# Patient Record
(demographics unavailable — no encounter records)

---

## 2025-01-09 NOTE — PHYSICAL EXAM
[Normal Breath Sounds] : Normal breath sounds [No Rash or Lesion] : No rash or lesion [Alert] : alert [Calm] : calm [JVD] : no jugular venous distention  [de-identified] : Overweight [de-identified] : Normal [de-identified] : Protuberant abdomen, large rectus diastasis [de-identified] : Incarcerated ventral hernia

## 2025-01-09 NOTE — ASSESSMENT
[FreeTextEntry1] : Kristen is a pleasant 50-year-old  presenting to the office with her family member Brinda (who is a recent patient of mine) with no significant past medical history other than a laparoscopic cholecystectomy in 2007 in [place] now presenting with pain and swelling in the mid abdomen suspicious for hernia.  Physical examination demonstrates a plum sized tender bulge 4-5 fingerbreadths above the umbilicus which is only partially reducible consistent with an incarcerated ventral hernia warranting surgical repair.  There is no evidence of strangulation, and the patient denies any symptoms of obstruction.  This hernia is complicated by a moderate to large rectus diastasis likely related to her excess abdominal weight and protuberant abdomen.  Her current BMI is 53.  I explained the pros and cons of surgery, as well as all risks, benefits, indications and alternatives of the procedure and the patient understood and agreed.  Kristen was scheduled for the repair of her incarcerated ventral hernia with mesh on Thursday, February 6, 2025 under LOCAL with IV SEDATION at [facility] with presurgical testing preceding this date. She was encouraged to avoid heavy lifting and strenuous activity in the interim, of course.  We also discussed the importance of calorie restriction, healthy eating, and moderate aerobic exercise with regard to weight loss, hernia recurrence and her overall health.

## 2025-02-19 NOTE — ASSESSMENT
[FreeTextEntry1] : SILVIO INFANTE underwent an incarcerated ventral hernia repair with mesh with Dr. Cadena on 2/6/25  under local IV sedation without any problems or complications. Her wound is clean, dry and intact. There is no evidence of erythema, seroma formation or infection. She is tolerating a diet and having normal bowel movements. She denies any significant postoperative pain or discomfort at this time.   She was counseled and reassured. SILVIO was discharged from the office with no specific follow up necessary, but she knows to avoid any heavy lifting or strenuous activity for the next several weeks. She  was encouraged to continue to wear her abdominal binder for the better part of the next month.

## 2025-02-19 NOTE — PHYSICAL EXAM
[JVD] : no jugular venous distention  [Respiratory Effort] : normal respiratory effort [Alert] : alert [Calm] : calm [de-identified] : overweight [de-identified] : normal [de-identified] : Protuberant abdomen, large rectus diastasis.   [de-identified] : Incision clean, dry, intact, no edema, no erythema, no drainage